# Patient Record
Sex: MALE | Race: BLACK OR AFRICAN AMERICAN | NOT HISPANIC OR LATINO | Employment: FULL TIME | URBAN - METROPOLITAN AREA
[De-identification: names, ages, dates, MRNs, and addresses within clinical notes are randomized per-mention and may not be internally consistent; named-entity substitution may affect disease eponyms.]

---

## 2018-04-24 ENCOUNTER — OFFICE VISIT (OUTPATIENT)
Dept: FAMILY MEDICINE | Facility: CLINIC | Age: 38
End: 2018-04-24
Payer: COMMERCIAL

## 2018-04-24 VITALS
BODY MASS INDEX: 40.66 KG/M2 | WEIGHT: 253 LBS | HEIGHT: 66 IN | HEART RATE: 56 BPM | RESPIRATION RATE: 16 BRPM | SYSTOLIC BLOOD PRESSURE: 131 MMHG | DIASTOLIC BLOOD PRESSURE: 86 MMHG

## 2018-04-24 DIAGNOSIS — Z13.220 SCREENING FOR HYPERLIPIDEMIA: Primary | ICD-10-CM

## 2018-04-24 LAB
CHOLEST SERPL-MCNC: 206 MG/DL
HDLC SERPL-MCNC: 58 MG/DL
LDLC SERPL CALC-MCNC: 136 MG/DL
NONHDLC SERPL-MCNC: 148 MG/DL
TRIGL SERPL-MCNC: 61 MG/DL

## 2018-04-24 PROCEDURE — 36415 COLL VENOUS BLD VENIPUNCTURE: CPT | Performed by: FAMILY MEDICINE

## 2018-04-24 PROCEDURE — 80061 LIPID PANEL: CPT | Performed by: FAMILY MEDICINE

## 2018-04-24 PROCEDURE — 99395 PREV VISIT EST AGE 18-39: CPT | Performed by: FAMILY MEDICINE

## 2018-04-24 ASSESSMENT — PAIN SCALES - GENERAL: PAINLEVEL: NO PAIN (0)

## 2018-04-24 NOTE — MR AVS SNAPSHOT
After Visit Summary   4/24/2018    Bishnu Astudillo    MRN: [id number]           Patient Information     Date Of Birth          1980        Visit Information        Provider Department      4/24/2018 4:00 PM Aaron Adams MD [facility]        Today's Diagnoses     Screening for hyperlipidemia    -  1      Care Instructions      Preventive Health Recommendations  Male Ages 26 - 39    Yearly exam:             See your health care provider every year in order to  o   Review health changes.   o   Discuss preventive care.    o   Review your medicines if your doctor has prescribed any.    You should be tested each year for STDs (sexually transmitted diseases), if you re at risk.     After age 35, talk to your provider about cholesterol testing. If you are at risk for heart disease, have your cholesterol tested at least every 5 years.     If you are at risk for diabetes, you should have a diabetes test (fasting glucose).  Shots: Get a flu shot each year. Get a tetanus shot every 10 years.     Nutrition:    Eat at least 5 servings of fruits and vegetables daily.     Eat whole-grain bread, whole-wheat pasta and brown rice instead of white grains and rice.     Talk to your provider about Calcium and Vitamin D.     Lifestyle    Exercise for at least 150 minutes a week (30 minutes a day, 5 days a week). This will help you control your weight and prevent disease.     Limit alcohol to one drink per day.     No smoking.     Wear sunscreen to prevent skin cancer.     See your dentist every six months for an exam and cleaning.   1. Check the [employer] web site for panama    2. Check pressure at home.  Goal 130/80    3. I will call on labs.           Follow-ups after your visit        Who to contact     If you have questions or need follow up information about today's clinic visit or your schedule please contact [facility] directly at [phone].  Normal or non-critical  "lab and imaging results will be communicated to you by MyChart, letter or phone within 4 business days after the clinic has received the results. If you do not hear from us within 7 days, please contact the clinic through [employer] or phone. If you have a critical or abnormal lab result, we will notify you by phone as soon as possible.  Submit refill requests through [employer] or call your pharmacy and they will forward the refill request to us. Please allow 3 business days for your refill to be completed.          Additional Information About Your Visit        [employer] Information     [employer] lets you send messages to your doctor, view your test results, renew your prescriptions, schedule appointments and more. To sign up, go to [email or web address] . Click on \"Log in\" on the left side of the screen, which will take you to the Welcome page. Then click on \"Sign up Now\" on the right side of the page.     You will be asked to enter the access code listed below, as well as some personal information. Please follow the directions to create your username and password.     Your access code is: [email or web address]  Expires: 2018  4:36 PM     Your access code will  in 90 days. If you need help or a new code, please call your [place] clinic or [phone].        Care EveryWhere ID     This is your Care EveryWhere ID. This could be used by other organizations to access your [place] medical records  [id number]        Your Vitals Were     Pulse Respirations Height BMI (Body Mass Index)          56 16 5' 6\" (1.676 m) 40.84 kg/m2         Blood Pressure from Last 3 Encounters:   18 140/83   09 116/70   08 112/72    Weight from Last 3 Encounters:   18 253 lb (114.8 kg)   09 210 lb 12.8 oz (95.6 kg)   08 237 lb (107.5 kg)              We Performed the Following     Lipid Profile        Primary Care Provider Office Phone # Fax #    TAMIE Lacy [phone] " [facility] [id number]        Equal Access to Services     JOHNNYMONIQUE PATRICK : Hadii aad ku hadamritavj Kathleen, waamandacolleen jimenez, cinthyalee freedmanmacolleen rao, jose aliciasantaelli licea. So [facility] [phone].    ATENCIÓN: Si habla español, tiene a stroud disposición servicios gratuitos de asistencia lingüística. Llame al [phone].    We comply with applicable federal civil rights laws and [place] laws. We do not discriminate on the basis of race, color, national origin, age, disability, sex, sexual orientation, or gender identity.            Thank you!     Thank you for choosing [facility]  for your care. Our goal is always to provide you with excellent care. Hearing back from our patients is one way we can continue to improve our services. Please take a few minutes to complete the written survey that you may receive in the mail after your visit with us. Thank you!             Your Updated Medication List - Protect others around you: Learn how to safely use, store and throw away your medicines at www.disposemymeds.org.          This list is accurate as of 4/24/18  4:36 PM.  Always use your most recent med list.                   Brand Name Dispense Instructions for use Diagnosis    NO ACTIVE MEDICATIONS      .

## 2018-04-24 NOTE — PATIENT INSTRUCTIONS
Preventive Health Recommendations  Male Ages 26 - 39    Yearly exam:             See your health care provider every year in order to  o   Review health changes.   o   Discuss preventive care.    o   Review your medicines if your doctor has prescribed any.    You should be tested each year for STDs (sexually transmitted diseases), if you re at risk.     After age 35, talk to your provider about cholesterol testing. If you are at risk for heart disease, have your cholesterol tested at least every 5 years.     If you are at risk for diabetes, you should have a diabetes test (fasting glucose).  Shots: Get a flu shot each year. Get a tetanus shot every 10 years.     Nutrition:    Eat at least 5 servings of fruits and vegetables daily.     Eat whole-grain bread, whole-wheat pasta and brown rice instead of white grains and rice.     Talk to your provider about Calcium and Vitamin D.     Lifestyle    Exercise for at least 150 minutes a week (30 minutes a day, 5 days a week). This will help you control your weight and prevent disease.     Limit alcohol to one drink per day.     No smoking.     Wear sunscreen to prevent skin cancer.     See your dentist every six months for an exam and cleaning.   1. Check the CDC web site for panama    2. Check pressure at home.  Goal 130/80    3. I will call on labs.

## 2018-04-24 NOTE — PROGRESS NOTES
SUBJECTIVE:   CC: Bishnu Astudillo is an 37 year old male who presents for preventative health visit.     Physical   Annual:     Getting at least 3 servings of Calcium per day::  Yes    Bi-annual eye exam::  Yes    Dental care twice a year::  NO    Sleep apnea or symptoms of sleep apnea::  None    Diet::  Regular (no restrictions)    Frequency of exercise::  6-7 days/week    Duration of exercise::  30-45 minutes    Taking medications regularly::  Not Applicable    Additional concerns today::  No                  Today's PHQ-2 Score:   PHQ-2 ( 1999 Pf[employer]) 4/24/2018   Q1: Little interest or pleasure in doing things 0   Q2: Feeling down, depressed or hopeless 0   PHQ-2 Score 0   Q1: Little interest or pleasure in doing things Not at all   Q2: Feeling down, depressed or hopeless Not at all   PHQ-2 Score 0       Abuse: Current or Past(Physical, Sexual or Emotional)- No  Do you feel safe in your environment - Yes    Social History   Substance Use Topics     Smoking status: Never Smoker     Smokeless tobacco: Never Used     Alcohol use No     Alcohol Use 4/24/2018   If you drink alcohol do you typically have greater than 3 drinks per day OR greater than 7 drinks per week? Not Applicable       Last PSA: No results found for: PSA    Reviewed orders with patient. Reviewed health maintenance and updated orders accordingly - Yes  Labs reviewed in EPIC  BP Readings from Last 3 Encounters:   04/24/18 131/86   04/20/09 116/70   03/24/08 112/72    Wt Readings from Last 3 Encounters:   04/24/18 253 lb (114.8 kg)   04/20/09 210 lb 12.8 oz (95.6 kg)   03/24/08 237 lb (107.5 kg)                  Patient Active Problem List   Diagnosis     CARDIOVASCULAR SCREENING; LDL GOAL LESS THAN 160     Past Surgical History:   Procedure Laterality Date     CYSTOSCOPY  2005       Social History   Substance Use Topics     Smoking status: Never Smoker     Smokeless tobacco: Never Used     Alcohol use No     History reviewed. No pertinent family history.  "     Current Outpatient Prescriptions   Medication Sig Dispense Refill     NO ACTIVE MEDICATIONS .         Reviewed and updated as needed this visit by clinical staff  Tobacco  Allergies  Meds  Med Hx  Surg Hx  Fam Hx  Soc Hx        Reviewed and updated as needed this visit by Provider            Review of Systems  C: NEGATIVE for fever, chills, change in weight  I: NEGATIVE for worrisome rashes, moles or lesions  E: NEGATIVE for vision changes or irritation  ENT: NEGATIVE for ear, mouth and throat problems  R: NEGATIVE for significant cough or SOB  CV: NEGATIVE for chest pain, palpitations or peripheral edema  GI: NEGATIVE for nausea, abdominal pain, heartburn, or change in bowel habits   male: negative for dysuria, hematuria, decreased urinary stream, erectile dysfunction, urethral discharge  M: NEGATIVE for significant arthralgias or myalgia  N: NEGATIVE for weakness, dizziness or paresthesias  P: NEGATIVE for changes in mood or affect    OBJECTIVE:   /83  Pulse 56  Resp 16  Ht 5' 6\" (1.676 m)  Wt 253 lb (114.8 kg)  BMI 40.84 kg/m2    Physical Exam  GENERAL: healthy, alert and no distress  NECK: no adenopathy, no asymmetry, masses, or scars and thyroid normal to palpation  RESP: lungs clear to auscultation - no rales, rhonchi or wheezes  CV: regular rate and rhythm, normal S1 S2, no S3 or S4, no murmur, click or rub, no peripheral edema and peripheral pulses strong  ABDOMEN: soft, nontender, no hepatosplenomegaly, no masses and bowel sounds normal  MS: no gross musculoskeletal defects noted, no edema    ASSESSMENT/PLAN:   1. Screening for hyperlipidemia    - Lipid Profile    COUNSELING:            reports that he has never smoked. He has never used smokeless tobacco.    Estimated body mass index is 40.84 kg/(m^2) as calculated from the following:    Height as of this encounter: 5' 6\" (1.676 m).    Weight as of this encounter: 253 lb (114.8 kg).     Counseling Resources:  ATP IV " Guidelines  Pooled Cohorts Equation Calculator  FRAX Risk Assessment  ICSI Preventive Guidelines  Dietary Guidelines for Americans, 2010  [employer]'s MyPlate  ASA Prophylaxis  Lung CA Screening    Aaron Adams MD  [facility]  Answers for HPI/ROS submitted by the patient on 4/24/2018   PHQ-2 Score: 0

## 2019-11-05 ENCOUNTER — ALLIED HEALTH/NURSE VISIT (OUTPATIENT)
Dept: FAMILY MEDICINE | Facility: CLINIC | Age: 39
End: 2019-11-05
Payer: COMMERCIAL

## 2019-11-05 DIAGNOSIS — Z23 NEED FOR PROPHYLACTIC VACCINATION AND INOCULATION AGAINST INFLUENZA: Primary | ICD-10-CM

## 2019-11-05 PROCEDURE — 90686 IIV4 VACC NO PRSV 0.5 ML IM: CPT

## 2019-11-05 PROCEDURE — 99207 ZZC NO CHARGE NURSE ONLY: CPT

## 2019-11-05 PROCEDURE — 90471 IMMUNIZATION ADMIN: CPT

## 2020-04-29 ENCOUNTER — VIRTUAL VISIT (OUTPATIENT)
Dept: FAMILY MEDICINE | Facility: CLINIC | Age: 40
End: 2020-04-29
Payer: COMMERCIAL

## 2020-04-29 DIAGNOSIS — J02.9 ACUTE PHARYNGITIS, UNSPECIFIED ETIOLOGY: Primary | ICD-10-CM

## 2020-04-29 PROCEDURE — 99213 OFFICE O/P EST LOW 20 MIN: CPT | Mod: TEL | Performed by: NURSE PRACTITIONER

## 2020-04-29 NOTE — PATIENT INSTRUCTIONS
1.  Information provided for sore throat management.  2.  Recommend no sharing of utensils or drinking off your wife's cups until her symptoms have resolved.  3.  Recommend that if you start to notice white spotting in the back of the throat or symptoms worsen and the soreness in the throat makes it hard for you to swallow, follow-up with clinic appointment for recheck.      Self-Care for Sore Throats    Sore throats happen for many reasons, such as colds, allergies, and infections caused by viruses or bacteria. In any case, your throat becomes red and sore. Your goal for self-care is to reduce your discomfort while giving your throat a chance to heal.  Moisten and soothe your throat  Tips include the following:    Try a sip of water first thing after waking up.    Keep your throat moist by drinking 6 or more glasses of clear liquids every day.    Run a cool-air humidifier in your room overnight.    Avoid cigarette smoke.     Suck on throat lozenges, cough drops, hard candy, ice chips, or frozen fruit-juice bars. Use the sugar-free versions if your diet or medical condition requires them.  Gargle to ease irritation  Gargling every hour or 2 can ease irritation. Try gargling with 1 of these solutions:    1/4 teaspoon of salt in 1/2 cup of warm water    An over-the-counter anesthetic gargle  Use medicine for more relief  Over-the-counter medicine can reduce sore throat symptoms. Ask your pharmacist if you have questions about which medicine to use:    Ease pain with anesthetic sprays. Aspirin or an aspirin substitute also helps. Remember, never give aspirin to anyone 18 or younger, or if you are already taking blood thinners.     For sore throats caused by allergies, try antihistamines to block the allergic reaction.    Remember: unless a sore throat is caused by a bacterial infection, antibiotics won t help you.  Prevent future sore throats  Prevention tips include the following:    Stop smoking or reduce contact  with secondhand smoke. Smoke irritates the tender throat lining.    Limit contact with pets and with allergy-causing substances, such as pollen and mold.    When you re around someone with a sore throat or cold, wash your hands often to keep viruses or bacteria from spreading.    Don t strain your vocal cords.  Contact your healthcare provider if you have:    A temperature over 101 F (38.3 C)    White spots on the throat    Great difficulty swallowing    Trouble breathing    A skin rash    Recent exposure to someone else with strep bacteria    Severe hoarseness and swollen glands in the neck or jaw  Date Last Reviewed: 8/1/2016 2000-2019 [employer]. [address] [place] [address], [place] [address]. All rights reserved. This information is not intended as a substitute for professional medical care. Always follow your healthcare professional's instructions.

## 2020-04-29 NOTE — PROGRESS NOTES
"Bishnu Astudillo is a 39 year old male who is being evaluated via a billable telephone visit.      The patient has been notified of following:     \"This telephone visit will be conducted via a call between you and your physician/provider. We have found that certain health care needs can be provided without the need for a physical exam.  This service lets us provide the care you need with a short phone conversation.  If a prescription is necessary we can send it directly to your pharmacy.  If lab work is needed we can place an order for that and you can then stop by our lab to have the test done at a later time.    Telephone visits are billed at different rates depending on your insurance coverage. During this emergency period, for some insurers they may be billed the same as an in-person visit.  Please reach out to your insurance provider with any questions.    If during the course of the call the physician/provider feels a telephone visit is not appropriate, you will not be charged for this service.\"    Patient has given verbal consent for Telephone visit?  Yes    How would you like to obtain your AVS? Mail a copy    Subjective     Bishnu Astudillo is a 39 year old male who presents to clinic today for the following health issues:    HPI  ENT Symptoms             Symptoms: cc Present Absent Comment   Fever/Chills   x    Fatigue   x    Muscle Aches   x    Eye Irritation   x    Sneezing   x    Nasal Heriberto/Drg   x    Sinus Pressure/Pain   x    Loss of smell   x    Dental pain   x    Sore Throat  x  3 days, wife is positive for thrush   Swollen Glands   x    Ear Pain/Fullness   x    Cough   x    Wheeze   x    Chest Pain   x    Shortness of breath   x    Rash   x    Other   x      Symptom duration:  3 days   Symptom severity:    Treatments tried:  none   Contacts: Pt has been home, 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  He has no white spots and no other " symptoms except a sore throat but not sore enough to reduce eating and drinking at this time.    Patient Active Problem List   Diagnosis     CARDIOVASCULAR SCREENING; LDL GOAL LESS THAN 160     Past Surgical History:   Procedure Laterality Date     CYSTOSCOPY  2005       Social History     Tobacco Use     Smoking status: Never Smoker     Smokeless tobacco: Never Used   Substance Use Topics     Alcohol use: No     History reviewed. No pertinent family history.      Current Outpatient Medications   Medication Sig Dispense Refill     NO ACTIVE MEDICATIONS .       No Known Allergies    Reviewed and updated as needed this visit by Provider  Tobacco  Allergies  Meds  Problems  Med Hx  Surg Hx  Fam Hx         Review of Systems   ROS COMP: CONSTITUTIONAL: NEGATIVE for fever, chills, change in weight  ENT/MOUTH: POSITIVE for sore throat  RESP: NEGATIVE for significant cough or SOB  CV: NEGATIVE for chest pain, palpitations or peripheral edema  PSYCHIATRIC: NEGATIVE for changes in mood or affect  ROS otherwise negative       Objective   Reported vitals:  There were no vitals taken for this visit.   healthy, alert and no distress  PSYCH: Alert and oriented times 3; coherent speech, normal   rate and volume, able to articulate logical thoughts, able   to abstract reason, no tangential thoughts, no hallucinations   or delusions  His affect is normal  RESP: No cough, no audible wheezing, able to talk in full sentences  Remainder of exam unable to be completed due to telephone visits    Diagnostic Test Results:  none         Assessment/Plan:  1. Acute pharyngitis, unspecified etiology  At this time patient has no white spots in the back of his throat or on his tongue.  Most likely viral pharyngitis.  Information provided on self care for sore throat.  Discussed ways to reduce transmission of thrush with his wife, see AVS.  Recommend follow-up if any worsening or persistent symptoms over the next week.      Return in about 1  week (around 5/6/2020), or if symptoms worsen or fail to improve.      Phone call duration:  6 minutes    Bhavani Richter NP

## 2020-10-08 ENCOUNTER — IMMUNIZATION (OUTPATIENT)
Dept: FAMILY MEDICINE | Facility: CLINIC | Age: 40
End: 2020-10-08
Payer: COMMERCIAL

## 2020-10-08 PROCEDURE — 90471 IMMUNIZATION ADMIN: CPT

## 2020-10-08 PROCEDURE — 90686 IIV4 VACC NO PRSV 0.5 ML IM: CPT

## 2023-06-26 ENCOUNTER — OFFICE VISIT (OUTPATIENT)
Dept: URGENT CARE | Facility: URGENT CARE | Age: 43
End: 2023-06-26
Payer: COMMERCIAL

## 2023-06-26 VITALS
BODY MASS INDEX: 44.55 KG/M2 | SYSTOLIC BLOOD PRESSURE: 131 MMHG | WEIGHT: 276 LBS | TEMPERATURE: 98.4 F | OXYGEN SATURATION: 98 % | HEART RATE: 69 BPM | DIASTOLIC BLOOD PRESSURE: 84 MMHG

## 2023-06-26 DIAGNOSIS — B49 FUNGAL INFECTION: Primary | ICD-10-CM

## 2023-06-26 PROCEDURE — 99203 OFFICE O/P NEW LOW 30 MIN: CPT

## 2023-06-26 RX ORDER — KETOCONAZOLE 20 MG/G
CREAM TOPICAL DAILY
Qty: 60 G | Refills: 0 | Status: SHIPPED | OUTPATIENT
Start: 2023-06-26

## 2023-06-26 RX ORDER — FLUCONAZOLE 200 MG/1
200 TABLET ORAL DAILY
Qty: 1 TABLET | Refills: 0 | Status: SHIPPED | OUTPATIENT
Start: 2023-06-26

## 2023-06-26 NOTE — PATIENT INSTRUCTIONS
Diagnosis: Fungal skin infection   Plan:   Wash and DRY the skin at least twice a day and before using the prescribed cream:    Wet the area.  Wash with a mild soap.  towels should only be used by the affected person and Machine wash the towel in hot, soapy water after every use.  Change clothing daily  Wear loose clothing when possible.  Change out of tight clothing like compression shorts, athletic supporters, bathing suits, tights, and pantyhose as soon as possible.    Monitor for:   rash does not start to get better in a week or so.  the rash spreads somewhere else on the body.  The infected skin looks redder or starts to ooze.       Fungal infections of the skin:   These rashes are:  Red and itchy    It is caused by a type of germ called a fungus.   It usually goes away after a few weeks of treatment with a medicated cream.  Causes:   This rash is spread by touching something, like a towel or sheet with a fungus on it, usually on open skin   Or from another human's fungal skin infection to an open area such as a scalp on anther person   Sweating a lot or wearing tight clothing makes it easier for the fungus to grow.  Prevention:   Keep skin clean and dry.  Wash well with soap and shampoo after any sport that involves skin-to-skin contact.  Wear loose clothing and change socks and underwear at least once a day.  Avoid sharing clothing, sports equipment, towels, brushes, and other personal items.  Wear flip flops when walking in a shower stall, a locker room, or pool area.  Some people are more prone to having a skin reaction when they contact the fungus than others and this maybe a lifelong reoccurrence for you

## 2023-06-26 NOTE — PROGRESS NOTES
URGENT CARE  Assessment & Plan   Assessment:   Bishnu Astudillo is a 42 year old male who's clinical presentation today is consistent with:   1. Fungal infection  - fluconazole (DIFLUCAN) 200 MG tablet; Take 1 tablet (200 mg) by mouth daily  Dispense: 1 tablet; Refill: 0  - ketoconazole (NIZORAL) 2 % external cream; Apply topically daily  Dispense: 60 g; Refill: 0    No alarm signs or symptoms present   Differential Diagnoses for this patient's CC include   Atopic dermatitis, allergic Dermatitis, Insect bite/sting, drug reaction,   impetigo, acne vulgaris,   Psoriasis, scabies, tinea, seborrheic dermatitis/keratosis, actinic keratosis,   other skin malignancy: SSC, BCC, melanoma   Lupus, solar lentigo, verruca, lichen planus, rosacea,   Molluscum contagiosum      Plan:  Will treat patient's rash} at this time with antifungal medications    Encourage patient to continue to monitor symptoms of increased  worsening/spreading infection and to follow up in 7-10 days if there is no improvement, sooner if they experience increasing redness, swelling, red streaks, new fevers, new regional lymphadenopathy or new pain.  Additionally educated patient on pain relief using ibuprofen/tylenol and elevation    Patient is} agreeable to treatment plan and state they will follow-up if symptoms do not improve and/or if symptoms worsen (see patient's AVS 'monitor for' section for specific patient instructions given and discussed regarding what to watch for and when to follow up)    Medications ordered are listed above, please see AVS for patient's specific and personalized discharge instructions given     MARISSA [facility] [place]      ______________________________________________________________________        Subjective  Subjective     HPI: Bishnu Astudillo  is a 42 year old  male who presents today for evaluation the following concerns:   Patient reports a rash noted to the bilateral feet which he has  had for years and now to his right hand, which started about a month ago.   Patient endorses the rash is  pruritic.   Patient states the rash is: pink, but flaky    Patient states they have tried: OTC triple antibiotic cream., but thinks it got worse    Denies triggers such as: any changes to clothing, laundry detergents, soaps, or other new household items that might have come in contact with the patient; known exposure to insect, plant, drug exposure (including OTC, alternative medications, or illicit drugs), contact with persons who are knowingly ill, occupational/chemical exposures, tightness or swelling in the tongue/throat/neck.   Patient denies any constitutional symptoms, respiratory difficulty, neither is rash associated w/ fever, arthralgias, URI symptoms, or other recent viral  syndromes. No change in speaking or breathing reported         No Known Allergies  Patient Active Problem List   Diagnosis     CARDIOVASCULAR SCREENING; LDL GOAL LESS THAN 160       Review of Systems:  Pertinent review of systems as reflected in HPI, otherwise negative.     Objective  Objective    Physical Exam:  Vitals:    06/26/23 1631   BP: 131/84   Pulse: 69   Temp: 98.4  F (36.9  C)   TempSrc: Tympanic   SpO2: 98%   Weight: 125.2 kg (276 lb)      General:  alert and oriented, no acute distress, non ill-appearing   Vital signs reviewed: afebrile and normotensive    Psy/mental status: pleasant   SKIN:   Right  Finger webs    fissuring with maceration noted with surrounding flaky skin as secondary morphology   Bilateral feet:    Noted diffuse flaking      I explained my diagnostic considerations and recommendations to the patient, who voiced understanding and agreement with the treatment plan.   All questions were answered.   We discussed potential side effects, risks and benefits of any prescribed or recommended therapies, as well as expectations for response to treatments.  Please see AVS for any patient instructions & handouts  given.   Patient was advised to contact the Nurse Care Line, their Primary Care provider, Urgent Care, or the Emergency Department if there are new or worsening symptoms, or call 911 for emergencies.        ______________________________________________________________________          Patient Instructions   Diagnosis: Fungal skin infection   Plan:     Wash and DRY the skin at least twice a day and before using the prescribed cream:      Wet the area.    Wash with a mild soap.    towels should only be used by the affected person and Machine wash the towel in hot, soapy water after every use.    Change clothing daily    Wear loose clothing when possible.    Change out of tight clothing like compression shorts, athletic supporters, bathing suits, tights, and pantyhose as soon as possible.    Monitor for:     rash does not start to get better in a week or so.    the rash spreads somewhere else on the body.    The infected skin looks redder or starts to ooze.       Fungal infections of the skin:   These rashes are:  Red and itchy    It is caused by a type of germ called a fungus.   It usually goes away after a few weeks of treatment with a medicated cream.  Causes:   This rash is spread by touching something, like a towel or sheet with a fungus on it, usually on open skin   Or from another human's fungal skin infection to an open area such as a scalp on anther person   Sweating a lot or wearing tight clothing makes it easier for the fungus to grow.  Prevention:     Keep skin clean and dry.    Wash well with soap and shampoo after any sport that involves skin-to-skin contact.    Wear loose clothing and change socks and underwear at least once a day.    Avoid sharing clothing, sports equipment, towels, brushes, and other personal items.    Wear flip flops when walking in a shower stall, a locker room, or pool area.    Some people are more prone to having a skin reaction when they contact the fungus than others and this  maybe a lifelong reoccurrence for you